# Patient Record
Sex: MALE | Race: WHITE | NOT HISPANIC OR LATINO | Employment: FULL TIME | URBAN - METROPOLITAN AREA
[De-identification: names, ages, dates, MRNs, and addresses within clinical notes are randomized per-mention and may not be internally consistent; named-entity substitution may affect disease eponyms.]

---

## 2019-08-19 ENCOUNTER — APPOINTMENT (EMERGENCY)
Dept: CT IMAGING | Facility: HOSPITAL | Age: 44
DRG: 660 | End: 2019-08-19
Payer: COMMERCIAL

## 2019-08-19 ENCOUNTER — HOSPITAL ENCOUNTER (INPATIENT)
Facility: HOSPITAL | Age: 44
LOS: 1 days | Discharge: HOME | DRG: 660 | End: 2019-08-20
Attending: EMERGENCY MEDICINE | Admitting: INTERNAL MEDICINE
Payer: COMMERCIAL

## 2019-08-19 DIAGNOSIS — D72.829 LEUKOCYTOSIS, UNSPECIFIED TYPE: ICD-10-CM

## 2019-08-19 DIAGNOSIS — N13.2 HYDRONEPHROSIS WITH URINARY OBSTRUCTION DUE TO URETERAL CALCULUS: ICD-10-CM

## 2019-08-19 DIAGNOSIS — N23 RENAL COLIC ON LEFT SIDE: Primary | ICD-10-CM

## 2019-08-19 DIAGNOSIS — R11.2 NAUSEA AND VOMITING, INTRACTABILITY OF VOMITING NOT SPECIFIED, UNSPECIFIED VOMITING TYPE: ICD-10-CM

## 2019-08-19 DIAGNOSIS — N13.30 HYDROURETERONEPHROSIS: ICD-10-CM

## 2019-08-19 LAB
ALBUMIN SERPL BCP-MCNC: 4.3 G/DL (ref 3.5–5)
ALP SERPL-CCNC: 75 U/L (ref 46–116)
ALT SERPL W P-5'-P-CCNC: 37 U/L (ref 12–78)
AMORPH PHOS CRY URNS QL MICRO: ABNORMAL /HPF
ANION GAP SERPL CALCULATED.3IONS-SCNC: 11 MMOL/L (ref 4–13)
AST SERPL W P-5'-P-CCNC: 21 U/L (ref 5–45)
BACTERIA UR QL AUTO: ABNORMAL /HPF
BILIRUB SERPL-MCNC: 0.65 MG/DL (ref 0.2–1)
BILIRUB UR QL STRIP: NEGATIVE
BUN SERPL-MCNC: 15 MG/DL (ref 5–25)
CALCIUM SERPL-MCNC: 9.8 MG/DL (ref 8.3–10.1)
CHLORIDE SERPL-SCNC: 101 MMOL/L (ref 100–108)
CLARITY UR: ABNORMAL
CO2 SERPL-SCNC: 25 MMOL/L (ref 21–32)
COLOR UR: YELLOW
CREAT SERPL-MCNC: 1.19 MG/DL (ref 0.6–1.3)
GFR SERPL CREATININE-BSD FRML MDRD: 73 ML/MIN/1.73SQ M
GLUCOSE SERPL-MCNC: 155 MG/DL (ref 65–140)
GLUCOSE UR STRIP-MCNC: NEGATIVE MG/DL
HGB UR QL STRIP.AUTO: ABNORMAL
KETONES UR STRIP-MCNC: ABNORMAL MG/DL
LEUKOCYTE ESTERASE UR QL STRIP: NEGATIVE
LIPASE SERPL-CCNC: 76 U/L (ref 73–393)
MAGNESIUM SERPL-MCNC: 2.1 MG/DL (ref 1.6–2.6)
NITRITE UR QL STRIP: NEGATIVE
NON-SQ EPI CELLS URNS QL MICRO: ABNORMAL /HPF
PH UR STRIP.AUTO: 8.5 [PH] (ref 4.5–8)
POTASSIUM SERPL-SCNC: 3.9 MMOL/L (ref 3.5–5.3)
PROT SERPL-MCNC: 7.8 G/DL (ref 6.4–8.2)
PROT UR STRIP-MCNC: ABNORMAL MG/DL
RBC #/AREA URNS AUTO: ABNORMAL /HPF
SODIUM SERPL-SCNC: 137 MMOL/L (ref 136–145)
SP GR UR STRIP.AUTO: 1.01 (ref 1–1.03)
UROBILINOGEN UR QL STRIP.AUTO: 0.2 E.U./DL
WBC #/AREA URNS AUTO: ABNORMAL /HPF

## 2019-08-19 PROCEDURE — 83690 ASSAY OF LIPASE: CPT | Performed by: NURSE PRACTITIONER

## 2019-08-19 PROCEDURE — 96361 HYDRATE IV INFUSION ADD-ON: CPT

## 2019-08-19 PROCEDURE — 85025 COMPLETE CBC W/AUTO DIFF WBC: CPT | Performed by: NURSE PRACTITIONER

## 2019-08-19 PROCEDURE — 99285 EMERGENCY DEPT VISIT HI MDM: CPT | Performed by: NURSE PRACTITIONER

## 2019-08-19 PROCEDURE — 99285 EMERGENCY DEPT VISIT HI MDM: CPT

## 2019-08-19 PROCEDURE — 96375 TX/PRO/DX INJ NEW DRUG ADDON: CPT

## 2019-08-19 PROCEDURE — 80053 COMPREHEN METABOLIC PANEL: CPT | Performed by: NURSE PRACTITIONER

## 2019-08-19 PROCEDURE — 81001 URINALYSIS AUTO W/SCOPE: CPT

## 2019-08-19 PROCEDURE — 36415 COLL VENOUS BLD VENIPUNCTURE: CPT | Performed by: NURSE PRACTITIONER

## 2019-08-19 PROCEDURE — 83735 ASSAY OF MAGNESIUM: CPT | Performed by: NURSE PRACTITIONER

## 2019-08-19 PROCEDURE — 74176 CT ABD & PELVIS W/O CONTRAST: CPT

## 2019-08-19 PROCEDURE — 96374 THER/PROPH/DIAG INJ IV PUSH: CPT

## 2019-08-19 RX ORDER — KETOROLAC TROMETHAMINE 30 MG/ML
30 INJECTION, SOLUTION INTRAMUSCULAR; INTRAVENOUS ONCE
Status: DISCONTINUED | OUTPATIENT
Start: 2019-08-19 | End: 2019-08-20

## 2019-08-19 RX ORDER — ONDANSETRON 2 MG/ML
4 INJECTION INTRAMUSCULAR; INTRAVENOUS ONCE
Status: COMPLETED | OUTPATIENT
Start: 2019-08-19 | End: 2019-08-19

## 2019-08-19 RX ORDER — HYDROMORPHONE HCL/PF 1 MG/ML
1 SYRINGE (ML) INJECTION ONCE
Status: COMPLETED | OUTPATIENT
Start: 2019-08-19 | End: 2019-08-19

## 2019-08-19 RX ADMIN — HYDROMORPHONE HYDROCHLORIDE 1 MG: 1 INJECTION, SOLUTION INTRAMUSCULAR; INTRAVENOUS; SUBCUTANEOUS at 22:05

## 2019-08-19 RX ADMIN — ONDANSETRON 4 MG: 2 INJECTION INTRAMUSCULAR; INTRAVENOUS at 22:05

## 2019-08-19 RX ADMIN — SODIUM CHLORIDE 1000 ML: 0.9 INJECTION, SOLUTION INTRAVENOUS at 22:04

## 2019-08-20 ENCOUNTER — APPOINTMENT (INPATIENT)
Dept: RADIOLOGY | Facility: HOSPITAL | Age: 44
DRG: 660 | End: 2019-08-20
Payer: COMMERCIAL

## 2019-08-20 ENCOUNTER — ANESTHESIA EVENT (INPATIENT)
Dept: PERIOP | Facility: HOSPITAL | Age: 44
DRG: 660 | End: 2019-08-20
Payer: COMMERCIAL

## 2019-08-20 ENCOUNTER — APPOINTMENT (INPATIENT)
Dept: CT IMAGING | Facility: HOSPITAL | Age: 44
DRG: 660 | End: 2019-08-20
Payer: COMMERCIAL

## 2019-08-20 ENCOUNTER — TELEPHONE (OUTPATIENT)
Dept: UROLOGY | Facility: CLINIC | Age: 44
End: 2019-08-20

## 2019-08-20 ENCOUNTER — ANESTHESIA (INPATIENT)
Dept: PERIOP | Facility: HOSPITAL | Age: 44
DRG: 660 | End: 2019-08-20
Payer: COMMERCIAL

## 2019-08-20 VITALS
OXYGEN SATURATION: 96 % | HEART RATE: 78 BPM | TEMPERATURE: 98.3 F | DIASTOLIC BLOOD PRESSURE: 82 MMHG | RESPIRATION RATE: 16 BRPM | SYSTOLIC BLOOD PRESSURE: 118 MMHG | WEIGHT: 198.41 LBS

## 2019-08-20 DIAGNOSIS — N13.30 HYDROURETERONEPHROSIS: Primary | ICD-10-CM

## 2019-08-20 PROBLEM — N13.2 HYDRONEPHROSIS WITH URINARY OBSTRUCTION DUE TO URETERAL CALCULUS: Status: RESOLVED | Noted: 2019-08-20 | Resolved: 2019-08-20

## 2019-08-20 PROBLEM — N23 RENAL COLIC ON LEFT SIDE: Status: ACTIVE | Noted: 2019-08-19

## 2019-08-20 PROBLEM — N13.2 HYDRONEPHROSIS WITH URINARY OBSTRUCTION DUE TO URETERAL CALCULUS: Status: ACTIVE | Noted: 2019-08-20

## 2019-08-20 PROBLEM — Z86.79 HISTORY OF PSVT (PAROXYSMAL SUPRAVENTRICULAR TACHYCARDIA): Status: ACTIVE | Noted: 2019-08-20

## 2019-08-20 LAB
ANION GAP SERPL CALCULATED.3IONS-SCNC: 10 MMOL/L (ref 4–13)
BUN SERPL-MCNC: 15 MG/DL (ref 5–25)
CALCIUM SERPL-MCNC: 9.5 MG/DL (ref 8.3–10.1)
CHLORIDE SERPL-SCNC: 103 MMOL/L (ref 100–108)
CO2 SERPL-SCNC: 25 MMOL/L (ref 21–32)
CREAT SERPL-MCNC: 1.24 MG/DL (ref 0.6–1.3)
ERYTHROCYTE [DISTWIDTH] IN BLOOD BY AUTOMATED COUNT: 11.9 % (ref 11.6–15.1)
ERYTHROCYTE [DISTWIDTH] IN BLOOD BY AUTOMATED COUNT: 12.2 % (ref 11.6–15.1)
GFR SERPL CREATININE-BSD FRML MDRD: 70 ML/MIN/1.73SQ M
GLUCOSE SERPL-MCNC: 113 MG/DL (ref 65–140)
GLUCOSE SERPL-MCNC: 92 MG/DL (ref 65–140)
HCT VFR BLD AUTO: 39.6 % (ref 36.5–49.3)
HCT VFR BLD AUTO: 41.6 % (ref 36.5–49.3)
HGB BLD-MCNC: 13.4 G/DL (ref 12–17)
HGB BLD-MCNC: 14.5 G/DL (ref 12–17)
INR PPP: 1.03 (ref 0.84–1.19)
MCH RBC QN AUTO: 32.4 PG (ref 26.8–34.3)
MCH RBC QN AUTO: 32.7 PG (ref 26.8–34.3)
MCHC RBC AUTO-ENTMCNC: 33.8 G/DL (ref 31.4–37.4)
MCHC RBC AUTO-ENTMCNC: 34.9 G/DL (ref 31.4–37.4)
MCV RBC AUTO: 93 FL (ref 82–98)
MCV RBC AUTO: 97 FL (ref 82–98)
PLATELET # BLD AUTO: 181 THOUSANDS/UL (ref 149–390)
PLATELET # BLD AUTO: 196 THOUSANDS/UL (ref 149–390)
PMV BLD AUTO: 12.6 FL (ref 8.9–12.7)
PMV BLD AUTO: 12.6 FL (ref 8.9–12.7)
POTASSIUM SERPL-SCNC: 4.5 MMOL/L (ref 3.5–5.3)
PROTHROMBIN TIME: 13.6 SECONDS (ref 11.6–14.5)
RBC # BLD AUTO: 4.1 MILLION/UL (ref 3.88–5.62)
RBC # BLD AUTO: 4.47 MILLION/UL (ref 3.88–5.62)
SODIUM SERPL-SCNC: 138 MMOL/L (ref 136–145)
WBC # BLD AUTO: 11.03 THOUSAND/UL (ref 4.31–10.16)
WBC # BLD AUTO: 13.6 THOUSAND/UL (ref 4.31–10.16)

## 2019-08-20 PROCEDURE — C1769 GUIDE WIRE: HCPCS | Performed by: UROLOGY

## 2019-08-20 PROCEDURE — 82360 CALCULUS ASSAY QUANT: CPT | Performed by: UROLOGY

## 2019-08-20 PROCEDURE — 99254 IP/OBS CNSLTJ NEW/EST MOD 60: CPT | Performed by: UROLOGY

## 2019-08-20 PROCEDURE — 99239 HOSP IP/OBS DSCHRG MGMT >30: CPT | Performed by: PHYSICIAN ASSISTANT

## 2019-08-20 PROCEDURE — 76000 FLUOROSCOPY <1 HR PHYS/QHP: CPT

## 2019-08-20 PROCEDURE — 80048 BASIC METABOLIC PNL TOTAL CA: CPT | Performed by: PHYSICIAN ASSISTANT

## 2019-08-20 PROCEDURE — 85610 PROTHROMBIN TIME: CPT | Performed by: PHYSICIAN ASSISTANT

## 2019-08-20 PROCEDURE — 82948 REAGENT STRIP/BLOOD GLUCOSE: CPT

## 2019-08-20 PROCEDURE — 52352 CYSTOURETERO W/STONE REMOVE: CPT | Performed by: UROLOGY

## 2019-08-20 PROCEDURE — 52332 CYSTOSCOPY AND TREATMENT: CPT | Performed by: UROLOGY

## 2019-08-20 PROCEDURE — C1758 CATHETER, URETERAL: HCPCS | Performed by: UROLOGY

## 2019-08-20 PROCEDURE — BT1F1ZZ FLUOROSCOPY OF LEFT KIDNEY, URETER AND BLADDER USING LOW OSMOLAR CONTRAST: ICD-10-PCS | Performed by: UROLOGY

## 2019-08-20 PROCEDURE — C2617 STENT, NON-COR, TEM W/O DEL: HCPCS | Performed by: UROLOGY

## 2019-08-20 PROCEDURE — 72192 CT PELVIS W/O DYE: CPT

## 2019-08-20 PROCEDURE — 99223 1ST HOSP IP/OBS HIGH 75: CPT | Performed by: PHYSICIAN ASSISTANT

## 2019-08-20 PROCEDURE — 87086 URINE CULTURE/COLONY COUNT: CPT | Performed by: UROLOGY

## 2019-08-20 PROCEDURE — 85027 COMPLETE CBC AUTOMATED: CPT | Performed by: PHYSICIAN ASSISTANT

## 2019-08-20 PROCEDURE — 0T778DZ DILATION OF LEFT URETER WITH INTRALUMINAL DEVICE, VIA NATURAL OR ARTIFICIAL OPENING ENDOSCOPIC: ICD-10-PCS | Performed by: UROLOGY

## 2019-08-20 DEVICE — STENT URETERAL 6 FR 26CM INLAY OPTIMA: Type: IMPLANTABLE DEVICE | Site: URETER | Status: FUNCTIONAL

## 2019-08-20 RX ORDER — ONDANSETRON 2 MG/ML
INJECTION INTRAMUSCULAR; INTRAVENOUS AS NEEDED
Status: DISCONTINUED | OUTPATIENT
Start: 2019-08-20 | End: 2019-08-20 | Stop reason: SURG

## 2019-08-20 RX ORDER — OXYCODONE HYDROCHLORIDE 5 MG/1
5 TABLET ORAL EVERY 4 HOURS PRN
Status: DISCONTINUED | OUTPATIENT
Start: 2019-08-20 | End: 2019-08-20 | Stop reason: HOSPADM

## 2019-08-20 RX ORDER — HYDROMORPHONE HCL/PF 1 MG/ML
0.5 SYRINGE (ML) INJECTION EVERY 4 HOURS PRN
Status: DISCONTINUED | OUTPATIENT
Start: 2019-08-20 | End: 2019-08-20 | Stop reason: HOSPADM

## 2019-08-20 RX ORDER — FENTANYL CITRATE 50 UG/ML
INJECTION, SOLUTION INTRAMUSCULAR; INTRAVENOUS AS NEEDED
Status: DISCONTINUED | OUTPATIENT
Start: 2019-08-20 | End: 2019-08-20 | Stop reason: SURG

## 2019-08-20 RX ORDER — TAMSULOSIN HYDROCHLORIDE 0.4 MG/1
0.4 CAPSULE ORAL
Qty: 5 CAPSULE | Refills: 0 | Status: SHIPPED | OUTPATIENT
Start: 2019-08-20 | End: 2019-08-20

## 2019-08-20 RX ORDER — MIDAZOLAM HYDROCHLORIDE 1 MG/ML
INJECTION INTRAMUSCULAR; INTRAVENOUS AS NEEDED
Status: DISCONTINUED | OUTPATIENT
Start: 2019-08-20 | End: 2019-08-20 | Stop reason: SURG

## 2019-08-20 RX ORDER — KETOROLAC TROMETHAMINE 30 MG/ML
30 INJECTION, SOLUTION INTRAMUSCULAR; INTRAVENOUS EVERY 6 HOURS SCHEDULED
Status: COMPLETED | OUTPATIENT
Start: 2019-08-20 | End: 2019-08-20

## 2019-08-20 RX ORDER — ONDANSETRON 2 MG/ML
4 INJECTION INTRAMUSCULAR; INTRAVENOUS EVERY 6 HOURS PRN
Status: DISCONTINUED | OUTPATIENT
Start: 2019-08-20 | End: 2019-08-20 | Stop reason: HOSPADM

## 2019-08-20 RX ORDER — SODIUM CHLORIDE 9 MG/ML
125 INJECTION, SOLUTION INTRAVENOUS CONTINUOUS
Status: DISCONTINUED | OUTPATIENT
Start: 2019-08-20 | End: 2019-08-20

## 2019-08-20 RX ORDER — TAMSULOSIN HYDROCHLORIDE 0.4 MG/1
0.4 CAPSULE ORAL
Qty: 5 CAPSULE | Refills: 0 | Status: SHIPPED | OUTPATIENT
Start: 2019-08-20 | End: 2019-12-06

## 2019-08-20 RX ORDER — PHENAZOPYRIDINE HYDROCHLORIDE 100 MG/1
100 TABLET, FILM COATED ORAL
Status: DISCONTINUED | OUTPATIENT
Start: 2019-08-20 | End: 2019-08-20 | Stop reason: HOSPADM

## 2019-08-20 RX ORDER — SODIUM CHLORIDE 9 MG/ML
75 INJECTION, SOLUTION INTRAVENOUS CONTINUOUS
Status: DISCONTINUED | OUTPATIENT
Start: 2019-08-20 | End: 2019-08-20 | Stop reason: HOSPADM

## 2019-08-20 RX ORDER — PROPOFOL 10 MG/ML
INJECTION, EMULSION INTRAVENOUS AS NEEDED
Status: DISCONTINUED | OUTPATIENT
Start: 2019-08-20 | End: 2019-08-20 | Stop reason: SURG

## 2019-08-20 RX ORDER — MAGNESIUM HYDROXIDE 1200 MG/15ML
LIQUID ORAL AS NEEDED
Status: DISCONTINUED | OUTPATIENT
Start: 2019-08-20 | End: 2019-08-20 | Stop reason: HOSPADM

## 2019-08-20 RX ORDER — PHENAZOPYRIDINE HYDROCHLORIDE 100 MG/1
100 TABLET, FILM COATED ORAL
Qty: 6 TABLET | Refills: 0 | Status: SHIPPED | OUTPATIENT
Start: 2019-08-20 | End: 2019-08-22

## 2019-08-20 RX ORDER — ACETAMINOPHEN 325 MG/1
650 TABLET ORAL EVERY 6 HOURS PRN
Status: DISCONTINUED | OUTPATIENT
Start: 2019-08-20 | End: 2019-08-20 | Stop reason: HOSPADM

## 2019-08-20 RX ORDER — SENNOSIDES 8.6 MG
1 TABLET ORAL
Status: DISCONTINUED | OUTPATIENT
Start: 2019-08-20 | End: 2019-08-20 | Stop reason: HOSPADM

## 2019-08-20 RX ORDER — SODIUM CHLORIDE 9 MG/ML
INJECTION, SOLUTION INTRAVENOUS AS NEEDED
Status: DISCONTINUED | OUTPATIENT
Start: 2019-08-20 | End: 2019-08-20 | Stop reason: HOSPADM

## 2019-08-20 RX ORDER — TAMSULOSIN HYDROCHLORIDE 0.4 MG/1
0.4 CAPSULE ORAL
Status: DISCONTINUED | OUTPATIENT
Start: 2019-08-20 | End: 2019-08-20 | Stop reason: HOSPADM

## 2019-08-20 RX ORDER — OXYCODONE HYDROCHLORIDE 5 MG/1
5 TABLET ORAL EVERY 4 HOURS PRN
Qty: 15 TABLET | Refills: 0 | Status: SHIPPED | OUTPATIENT
Start: 2019-08-20 | End: 2019-08-20

## 2019-08-20 RX ORDER — PHENAZOPYRIDINE HYDROCHLORIDE 100 MG/1
100 TABLET, FILM COATED ORAL
Qty: 15 TABLET | Refills: 0 | Status: SHIPPED | OUTPATIENT
Start: 2019-08-20 | End: 2019-08-20

## 2019-08-20 RX ORDER — MAGNESIUM HYDROXIDE/ALUMINUM HYDROXICE/SIMETHICONE 120; 1200; 1200 MG/30ML; MG/30ML; MG/30ML
30 SUSPENSION ORAL EVERY 6 HOURS PRN
Status: DISCONTINUED | OUTPATIENT
Start: 2019-08-20 | End: 2019-08-20 | Stop reason: HOSPADM

## 2019-08-20 RX ORDER — DEXAMETHASONE SODIUM PHOSPHATE 4 MG/ML
INJECTION, SOLUTION INTRA-ARTICULAR; INTRALESIONAL; INTRAMUSCULAR; INTRAVENOUS; SOFT TISSUE AS NEEDED
Status: DISCONTINUED | OUTPATIENT
Start: 2019-08-20 | End: 2019-08-20 | Stop reason: SURG

## 2019-08-20 RX ORDER — CEFAZOLIN SODIUM 1 G/50ML
1000 SOLUTION INTRAVENOUS EVERY 8 HOURS
Status: DISCONTINUED | OUTPATIENT
Start: 2019-08-20 | End: 2019-08-20 | Stop reason: HOSPADM

## 2019-08-20 RX ORDER — OXYCODONE HYDROCHLORIDE 10 MG/1
10 TABLET ORAL EVERY 4 HOURS PRN
Status: DISCONTINUED | OUTPATIENT
Start: 2019-08-20 | End: 2019-08-20 | Stop reason: HOSPADM

## 2019-08-20 RX ORDER — LIDOCAINE HYDROCHLORIDE 20 MG/ML
INJECTION, SOLUTION EPIDURAL; INFILTRATION; INTRACAUDAL; PERINEURAL AS NEEDED
Status: DISCONTINUED | OUTPATIENT
Start: 2019-08-20 | End: 2019-08-20 | Stop reason: SURG

## 2019-08-20 RX ORDER — OXYCODONE HYDROCHLORIDE 5 MG/1
5 TABLET ORAL EVERY 4 HOURS PRN
Qty: 15 TABLET | Refills: 0 | Status: SHIPPED | OUTPATIENT
Start: 2019-08-20 | End: 2019-08-25

## 2019-08-20 RX ORDER — LIDOCAINE HYDROCHLORIDE 20 MG/ML
JELLY TOPICAL AS NEEDED
Status: DISCONTINUED | OUTPATIENT
Start: 2019-08-20 | End: 2019-08-20 | Stop reason: HOSPADM

## 2019-08-20 RX ADMIN — CEFAZOLIN SODIUM 1000 MG: 1 SOLUTION INTRAVENOUS at 00:45

## 2019-08-20 RX ADMIN — LIDOCAINE HYDROCHLORIDE 60 MG: 20 INJECTION, SOLUTION EPIDURAL; INFILTRATION; INTRACAUDAL; PERINEURAL at 11:48

## 2019-08-20 RX ADMIN — FENTANYL CITRATE 50 MCG: 50 INJECTION, SOLUTION INTRAMUSCULAR; INTRAVENOUS at 11:48

## 2019-08-20 RX ADMIN — MIDAZOLAM 2 MG: 1 INJECTION INTRAMUSCULAR; INTRAVENOUS at 11:42

## 2019-08-20 RX ADMIN — SODIUM CHLORIDE 75 ML/HR: 0.9 INJECTION, SOLUTION INTRAVENOUS at 00:45

## 2019-08-20 RX ADMIN — CEFAZOLIN SODIUM 1000 MG: 1 SOLUTION INTRAVENOUS at 11:45

## 2019-08-20 RX ADMIN — SODIUM CHLORIDE 75 ML/HR: 0.9 INJECTION, SOLUTION INTRAVENOUS at 12:48

## 2019-08-20 RX ADMIN — PROPOFOL 200 MG: 10 INJECTION, EMULSION INTRAVENOUS at 11:48

## 2019-08-20 RX ADMIN — KETOROLAC TROMETHAMINE 30 MG: 30 INJECTION, SOLUTION INTRAMUSCULAR at 00:45

## 2019-08-20 RX ADMIN — PHENAZOPYRIDINE 100 MG: 100 TABLET ORAL at 14:07

## 2019-08-20 RX ADMIN — ONDANSETRON 4 MG: 2 INJECTION INTRAMUSCULAR; INTRAVENOUS at 11:55

## 2019-08-20 RX ADMIN — CEFAZOLIN SODIUM 1000 MG: 1 SOLUTION INTRAVENOUS at 08:58

## 2019-08-20 RX ADMIN — KETOROLAC TROMETHAMINE 30 MG: 30 INJECTION, SOLUTION INTRAMUSCULAR at 10:58

## 2019-08-20 RX ADMIN — DEXAMETHASONE SODIUM PHOSPHATE 4 MG: 4 INJECTION, SOLUTION INTRAMUSCULAR; INTRAVENOUS at 11:55

## 2019-08-20 RX ADMIN — FENTANYL CITRATE 50 MCG: 50 INJECTION, SOLUTION INTRAMUSCULAR; INTRAVENOUS at 12:00

## 2019-08-20 RX ADMIN — TAMSULOSIN HYDROCHLORIDE 0.4 MG: 0.4 CAPSULE ORAL at 14:07

## 2019-08-20 RX ADMIN — KETOROLAC TROMETHAMINE 30 MG: 30 INJECTION, SOLUTION INTRAMUSCULAR at 05:00

## 2019-08-20 NOTE — ED PROVIDER NOTES
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, then developed a left lower abdomen pain and vomited once and it was brown in color  This is a 38year old male who states was working in an un airconditioned building today and thought maybe he had heat stroke around 12 noon today as he became hot, and sweaty  He went inside his home, laid in front of the air conditioner and seemed to feel better  He returned to that same area to complete his work and again wasn't feeling well  He states about 430pm he got into his car to go  his daughter from day care and developed severe sharp LLQ pain that radiates into the left flank  His wife states about 2 hours ago he vomited brown liquid emesis  Pt denies any hx of abdominal problems or surgeries  States last BM today 10pm and was normal   He denies any dysuria or hematuria but states is only voiding a little  Denies hx of kidney stones  Denies testicle pain, swelling or hernia hx       History provided by:  Patient, medical records and spouse   used: No        None       History reviewed  No pertinent past medical history  History reviewed  No pertinent surgical history  History reviewed  No pertinent family history  I have reviewed and agree with the history as documented  Social History     Tobacco Use    Smoking status: Never Smoker    Smokeless tobacco: Never Used   Substance Use Topics    Alcohol use: Yes     Frequency: Never     Comment: daily 2 beers    Drug use: Never        Review of Systems   Constitutional:        Sweats    HENT: Negative  Eyes: Negative  Respiratory: Negative  Cardiovascular: Negative  Gastrointestinal: Positive for abdominal pain, nausea and vomiting  Endocrine: Negative  Genitourinary: Positive for decreased urine volume and flank pain  Skin: Negative  Allergic/Immunologic: Negative  Neurological: Negative  Hematological: Negative  Psychiatric/Behavioral: Negative  Physical Exam  Physical Exam   Constitutional: He is oriented to person, place, and time  He appears well-developed and well-nourished  Non-toxic appearance  He appears ill  No distress  Appears not to feel well but is not toxic    HENT:   Head: Normocephalic and atraumatic  Mouth/Throat: Oropharynx is clear and moist    Eyes: Pupils are equal, round, and reactive to light  EOM are normal    Cardiovascular: Normal rate, regular rhythm and normal heart sounds  Pulmonary/Chest: Effort normal and breath sounds normal    Abdominal: Normal appearance  He exhibits distension  Bowel sounds are increased  There is tenderness in the left lower quadrant  There is CVA tenderness  Hyper bs RUQ and RLQ  Left flank pain    Neurological: He is alert and oriented to person, place, and time  Skin: Skin is warm  Capillary refill takes less than 2 seconds  There is pallor  Psychiatric: He has a normal mood and affect  His behavior is normal    Nursing note and vitals reviewed        Vital Signs  ED Triage Vitals   Temperature Pulse Respirations Blood Pressure SpO2   08/19/19 2139 08/19/19 2139 08/19/19 2139 08/19/19 2139 08/19/19 2139   (!) 97 3 °F (36 3 °C) 88 18 155/97 98 %      Temp Source Heart Rate Source Patient Position - Orthostatic VS BP Location FiO2 (%)   08/19/19 2139 08/19/19 2139 08/19/19 2139 08/19/19 2139 --   Temporal Monitor Sitting Right arm       Pain Score       08/19/19 2205       Worst Possible Pain           Vitals:    08/19/19 2139 08/19/19 2243 08/20/19 0010 08/20/19 0029   BP: 155/97 142/83 151/91 156/86   Pulse: 88 94 100 94   Patient Position - Orthostatic VS: Sitting Lying Lying Lying         Visual Acuity      ED Medications  Medications   sodium chloride 0 9 % infusion (75 mL/hr Intravenous New Bag 8/20/19 0045)   ondansetron (ZOFRAN) injection 4 mg (has no administration in time range) aluminum-magnesium hydroxide-simethicone (MYLANTA) 200-200-20 mg/5 mL oral suspension 30 mL (has no administration in time range)   senna (SENOKOT) tablet 8 6 mg (has no administration in time range)   acetaminophen (TYLENOL) tablet 650 mg (has no administration in time range)   ketorolac (TORADOL) injection 30 mg (30 mg Intravenous Given 8/20/19 0045)   HYDROmorphone (DILAUDID) injection 0 5 mg (has no administration in time range)   oxyCODONE (ROXICODONE) immediate release tablet 10 mg (has no administration in time range)   oxyCODONE (ROXICODONE) IR tablet 5 mg (has no administration in time range)   ceFAZolin (ANCEF) IVPB (premix) 1,000 mg (1,000 mg Intravenous New Bag 8/20/19 0045)   ondansetron (ZOFRAN) injection 4 mg (4 mg Intravenous Given 8/19/19 2205)   sodium chloride 0 9 % bolus 1,000 mL (0 mL Intravenous Stopped 8/19/19 2330)   HYDROmorphone (DILAUDID) injection 1 mg (1 mg Intravenous Given 8/19/19 2205)       Diagnostic Studies  Results Reviewed     Procedure Component Value Units Date/Time    CBC and differential [[id number]]  (Abnormal) Collected:  08/19/19 2203    Lab Status:  Final result Specimen:  Blood from Arm, Left Updated:  08/20/19 0036     WBC 13 60 Thousand/uL      RBC 4 47 Million/uL      Hemoglobin 14 5 g/dL      Hematocrit 41 6 %      MCV 93 fL      MCH 32 4 pg      MCHC 34 9 g/dL      RDW 11 9 %      MPV 12 6 fL      Platelets 567 Thousands/uL     Narrative: This is an appended report  These results have been appended to a previously verified report      Comprehensive metabolic panel [[id number]]  (Abnormal) Collected:  08/19/19 2203    Lab Status:  Final result Specimen:  Blood from Arm, Left Updated:  08/19/19 2240     Sodium 137 mmol/L      Potassium 3 9 mmol/L      Chloride 101 mmol/L      CO2 25 mmol/L      ANION GAP 11 mmol/L      BUN 15 mg/dL      Creatinine 1 19 mg/dL      Glucose 155 mg/dL      Calcium 9 8 mg/dL      AST 21 U/L      ALT 37 U/L      Alkaline Phosphatase 75 U/L      Total Protein 7 8 g/dL      Albumin 4 3 g/dL      Total Bilirubin 0 65 mg/dL      eGFR 73 ml/min/1 73sq m     Narrative:       [place] guidelines for Chronic Kidney Disease (CKD):     Stage 1 with normal or high GFR (GFR > 90 mL/min/1 73 square meters)    Stage 2 Mild CKD (GFR = 60-89 mL/min/1 73 square meters)    Stage 3A Moderate CKD (GFR = 45-59 mL/min/1 73 square meters)    Stage 3B Moderate CKD (GFR = 30-44 mL/min/1 73 square meters)    Stage 4 Severe CKD (GFR = 15-29 mL/min/1 73 square meters)    Stage 5 End Stage CKD (GFR <15 mL/min/1 73 square meters)  Note: GFR calculation is accurate only with a steady state creatinine    Lipase [[id number]]  (Normal) Collected:  08/19/19 2203    Lab Status:  Final result Specimen:  Blood from Arm, Left Updated:  08/19/19 2240     Lipase 76 u/L     Magnesium [[id number]]  (Normal) Collected:  08/19/19 2203    Lab Status:  Final result Specimen:  Blood from Arm, Left Updated:  08/19/19 2240     Magnesium 2 1 mg/dL     Urine Microscopic [[id number]]  (Abnormal) Collected:  08/19/19 2207    Lab Status:  Final result Specimen:  Urine, Clean Catch Updated:  08/19/19 2238     RBC, UA 2-4 /hpf      WBC, UA None Seen /hpf      Epithelial Cells Occasional /hpf      Bacteria, UA Occasional /hpf      AMORPH PHOSPATES Occasional /hpf     POCT urinalysis dipstick [[id number]]  (Abnormal) Resulted:  08/19/19 2218    Lab Status:  Final result Specimen:  Urine Updated:  08/19/19 2218    ED Urine Macroscopic [[id number]]  (Abnormal) Collected:  08/19/19 2207    Lab Status:  Final result Specimen:  Urine Updated:  08/19/19 2208     Color, UA Yellow     Clarity, UA Turbid     pH, UA 8 5     Leukocytes, UA Negative     Nitrite, UA Negative     Protein, UA Trace mg/dl      Glucose, UA Negative mg/dl      Ketones, UA Trace mg/dl      Urobilinogen, UA 0 2 E U /dl      Bilirubin, UA Negative     Blood, UA Small     Specific [place], UA 1 015    Narrative: [facility] RESULT                 CT renal stone study abdomen pelvis without contrast   Final Result by Juan Marrero DO (08/19 2309)      Moderate left-sided hydroureteronephrosis with a 3 mm obstructing stone in the distal left ureter/UVJ  Moderate left-sided perinephric stranding  Mild thickening of the urinary bladder wall which may represent cystitis  Workstation performed: [id number]                    Procedures  Procedures       ED Course  ED Course as of Aug 20 0120   [facility] Aug 19, 2019   2247 Urine small blood, trace ketones and protein  WBC 13 60  Glucose 155  Waiting on dry CT results  2331 IMPRESSION:     Moderate left-sided hydroureteronephrosis with a 3 mm obstructing stone in the distal left ureter/UVJ  Moderate left-sided perinephric stranding      Mild thickening of the urinary bladder wall which may represent cystitis           2332 Lab micro urine negative for WBC, bacteria  CT + 3mm obstruction stone in the distal left ureter, UVJ moderate Left sided perinephric stranding  1 All labs and radiology results reviewed and discussed with pt and wife  2336 Pt agreeable for admission  2336 Page to [place] for admission  26 [facility] has accepted for admission  MDM  Number of Diagnoses or Management Options  Diagnosis management comments: Differential diagnosis  Renal colic  Kidney stone  Diverticulitis  Bowel obstruction    Plan  Labs  IVF  IV  Pain control  NPO  CT A/P - will dry scan to rule out kidney stone  If negative will order CT A/P with oral and IV contrast to rule out other pathology     Urine         Amount and/or Complexity of Data Reviewed  Clinical lab tests: ordered and reviewed  Tests in the radiology section of CPT®: ordered and reviewed  Review and summarize past medical records: yes        Disposition  Final diagnoses:   Renal colic on left side   Hydroureteronephrosis   Leukocytosis, unspecified type Nausea and vomiting, intractability of vomiting not specified, unspecified vomiting type     Time reflects when diagnosis was documented in both MDM as applicable and the Disposition within this note     Time User Action Codes Description Comment    8/19/2019 11:37 PM Apurva Fernandes Add [P42] Renal colic on left side     8/19/2019 11:37 PM Lawrence Norrisraudel [N13 30] Hydroureteronephrosis     8/19/2019 11:38 PM Apurva Dena Add [D72 829] Leukocytosis, unspecified type     8/19/2019 11:38 PM Apurva Dena Add [R11 2] Nausea and vomiting, intractability of vomiting not specified, unspecified vomiting type       ED Disposition     ED Disposition Condition Date/Time Comment    Admit Stable Mon Aug 19, 2019 11:55 PM Case was discussed with CHARI  and the patient's admission status was agreed to be Admission Status: inpatient status to the service of Dr Rosie Chaudhry   Follow-up Information    None         There are no discharge medications for this patient  No discharge procedures on file      ED Provider  Electronically Signed by           ISAAC Ramirez  08/20/19 0120

## 2019-08-20 NOTE — OP NOTE
OPERATIVE REPORT  PATIENT NAME: Tristen Shore    :  1975  MRN: [id number]  Pt Location: [facility] OR ROOM 05    SURGERY DATE: 2019    Surgeon(s) and Role:     * Brad Benjamin MD - Primary    Preop Diagnosis:  Renal colic on left side [M00]  Hydroureteronephrosis [N13 30]    Post-Op Diagnosis Codes:     * Renal colic on left side [O19]     * Hydroureteronephrosis [N13 30]    Procedure(s) (LRB):  CYSTOSCOPY URETEROSCOPY; RETROGRADE PYELOGRAM AND INSERTION STENT URETERAL (Left)    Specimen(s):  ID Type Source Tests Collected by Time Destination   A :  Urine Urine, Cystoscopic URINE CULTURE Brad Benjamin MD 2019 1203    B :  Calculus Kidney, Left STONE ANALYSIS Brad Benjamin MD 2019 1210        Estimated Blood Loss:   Minimal    Drains:  Ureteral Drain/Stent Left ureter 6 Fr  (Active)   Number of days: 0       Anesthesia Type:   General    Operative Indications:  Renal colic on left side [R79]  Hydroureteronephrosis [N13 30]      Operative Findings:  Distal ureteral stone    Complications:   None    Procedure and Technique:  Tristen Shore is a 38y o -year-old male  with a history of left distal ureteral stone  No prior stone history  Risk and benefits of ureteroscopy were discussed and reviewed  Informed consent was obtained  The patient was brought to the operating room on 2019  After the smooth induction of general LMA anesthesia, the patient was placed in the dorsal lithotomy position  His genitalia was prepped and draped in a sterile fashion  Intravenous antibiotics were administered in the form of Ancef  A timeout was performed with all members of the operative team confirmed the patient's identity, procedure to be performed, and laterality of the case  A 22 [place]ian rigid cystoscope with 30° lens was inserted  The bladder was thoroughly inspected  It was no evidence of mucosal abnormalities or lesions  There were no calculi identified   Attention was focused on the left ureteral orifice   flouroscopy demonstrated a non radioopaque stone  A Bard Solo Plus wire was passed proximal to the stone and secured as a safety  A dual lumen catheter was then advanced over the wire and used to dilate the very distal ureter under direct vision  A retrograde pyelogram was performed that demonstrated mild hydroureteronephrosis  The dual-lumen was removed leaving the safety wire in place  A long semirigid ureteroscope was then inserted adjacent to the wire  The stone was identified and removed with an expand basket  The ureteroscope was then repassed multiple times to ensure that the ureter was free and clear of any residual stones  Additional contrast was injected as a roadmap for stent insertion  The ureteroscope was then removed  The wire was backloaded through the cystoscope  The cystoscope was repassed into the bladder  A 6 Urdu 26 double-J ureteral stent was then placed over the previously banked safety wire without difficulty  The proximal coil was appreciated in the left renal pelvis and the distal coil was visualized within the bladder  The bladder was emptied and the cystoscope was removed  A string was left in place and secured to the dorsal penis with Tegaderm  2% viscous lidocaine was placed per urethra  Overall the patient tolerated the procedure well and were no complications  The patient was extubated in the operating room and transferred to the PACU in stable condition at the conclusion of the case  Plan-stent and string removal in 3-5 days, follow up in our office for stone disease         I was present for the entire procedure    Patient Disposition:  PACU     SIGNATURE: Loco Light MD  DATE: August 20, 2019  TIME: 12:22 PM

## 2019-08-20 NOTE — ASSESSMENT & PLAN NOTE
Patient presents with 12 hours of LLQ abdominal pain with nausea and vomiting  No prior history of kidney stones   CT with evidence of moderate left hydroureteronephrosis with 3 mm obstructing stone in distal left UVJ, moderate perinephric stranding   Will begin IV Ancef 1 g q8   Urology consult  NPO until seen by urology   Scheduled Toradol over night with PRN oxycodone and dilaudid for breakthrough   PRN Zofran    Gentle IV fluid hydration

## 2019-08-20 NOTE — CONSULTS
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  If the stone has passed, will discharged home with outpatient follow-up  If the stone is still present, discussed plan for surgery with patient including cystoscopy, [LEFT] retrograde pyelogram, ureteroscopy with possible laser lithotripsy and basket extraction of stone, stent placement  Risks and benefits discussed and patient has signed informed consent  Thank you for allowing me to participate in this patients care  Please do not hesitate to call with any additional questions  Chencho Chino MD    History of Present Illness:     John Euceda is a 38 y o  old with a history of no prior stone disease  Patient presents with acute left-sided pain  CT scan demonstrates 3 mm ureterovesical junction stone  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  He is anxious about the return of pain  After admission overnight, the patient's pain has completely resolved  Past Medical, Past Surgical History:   History reviewed  No pertinent past medical history :    History reviewed   No pertinent surgical history :    Medications, Allergies:     Current Facility-Administered Medications:     acetaminophen (TYLENOL) tablet 650 mg, 650 mg, Oral, Q6H PRN, Makayla Murray PA-C    aluminum-magnesium hydroxide-simethicone (MYLANTA) 200-200-20 mg/5 mL oral suspension 30 mL, 30 mL, Oral, Q6H PRN, Nino Murray PA-C    ceFAZolin (ANCEF) IVPB (premix) 1,000 mg, 1,000 mg, Intravenous, Q8H, Makayla Murray, PA-C, Stopped at 08/20/19 0115    HYDROmorphone (DILAUDID) injection 0 5 mg, 0 5 mg, Intravenous, Q4H PRN, Carlitos Murray PA-C    ketorolac (TORADOL) injection 30 mg, 30 mg, Intravenous, Q6H YUE, Makayla Murray, PA-C, 30 mg at 08/20/19 0500    ondansetron (ZOFRAN) injection 4 mg, 4 mg, Intravenous, Q6H PRN, Carlitos Murray, PA-C    oxyCODONE (ROXICODONE) immediate release tablet 10 mg, 10 mg, Oral, Q4H PRN, Carlitos Murray, PA-C    oxyCODONE (ROXICODONE) IR tablet 5 mg, 5 mg, Oral, Q4H PRN, Carlitos Murray, PA-C    senna (SENOKOT) tablet 8 6 mg, 1 tablet, Oral, HS PRN, Carlitos Murray PA-C    sodium chloride 0 9 % infusion, 75 mL/hr, Intravenous, Continuous, Makayla Murray PA-C, Last Rate: 75 mL/hr at 08/20/19 0045, 75 mL/hr at 08/20/19 0045    Allergies:  No Known Allergies:    Social and Family History:   Social History:   Social History     Tobacco Use    Smoking status: Never Smoker    Smokeless tobacco: Never Used   Substance Use Topics    Alcohol use: Yes     Frequency: Never     Comment: daily 2 beers    Drug use: Never     Social History     Tobacco Use   Smoking Status Never Smoker   Smokeless Tobacco Never Used       Family History:  History reviewed  No pertinent family history :     Review of Systems:     General: Fever, chills, or night sweats: negative  Cardiac: Negative for chest pain  Pulmonary: Negative for shortness of breath  Gastrointestinal: Abdominal pain positive  Nausea, vomiting, or diarrhea negative,  Genitourinary: See HPI above  Patient does not have hematuria  All other systems queried were negative  Physical Exam:   General: Patient is pleasant and in NAD  Awake and alert  /86 (BP Location: Left arm)   Pulse 94   Temp 97 9 °F (36 6 °C) (Temporal)   Resp 20   Wt 90 kg (198 lb 6 6 oz)   SpO2 91%   Cardiac: Peripheral edema: negative  Pulmonary: Non-labored breathing  Abdomen: Soft, non-tender, non-distended  No surgical scars  No masses, tenderness, hernias noted  Genitourinary: Positive CVA tenderness, negative suprapubic tenderness  (Male): Penis circumcised, phallus normal, meatus patent  Testicles descended into scrotum bilaterally without masses nor tenderness  No inguinal hernias bilaterally  Labs:     Lab Results   Component Value Date    HGB 13 4 08/20/2019    HCT 39 6 08/20/2019    WBC 11 03 (H) 08/20/2019     08/20/2019   ]    Lab Results   Component Value Date    K 4 5 08/20/2019     08/20/2019    CO2 25 08/20/2019    BUN 15 08/20/2019    CREATININE 1 24 08/20/2019    CALCIUM 9 5 08/20/2019 8/19/19 2207     RBC, UA None Seen, 0-5 /hpf 2-4Abnormal     WBC, UA None Seen, 0-5, 5-55, 5-65 /hpf None Seen    Epithelial Cells None Seen, Occasional /hpf Occasional    Bacteria, UA None Seen, Occasional /hpf Occasional    AMORPH PHOSPATES /hpf Occasional          Specimen Collected: 08/19/19 22:07        Imaging:   I personally reviewed the images and report of the following studies, and reviewed them with the patient:    8/19/19  CT ABDOMEN AND PELVIS WITHOUT IV CONTRAST - LOW DOSE RENAL STONE      INDICATION:   Abdominal pain, unspecified  Flank pain, stone disease suspected  Hematuria      COMPARISON:  None      TECHNIQUE:  Low dose thin section CT examination of the abdomen and pelvis was performed without intravenous or oral contrast according to a protocol specifically designed to evaluate for urinary tract calculus  Axial, sagittal, and coronal 2D   reformatted images were created from the source data and submitted for interpretation  Evaluation for pathology in the abdomen and pelvis that is unrelated to urinary tract calculi is limited       Radiation dose length product (DLP) for this visit:  573 mGy-cm     This examination, like all CT scans performed in the [facility], was performed utilizing techniques to minimize radiation dose exposure, including the use of iterative reconstruction and automated exposure control       FINDINGS:     RIGHT KIDNEY AND URETER:  No urinary tract calculi  No hydronephrosis or hydroureter      LEFT KIDNEY AND URETER:  Moderate left-sided hydroureteronephrosis with a 3 mm obstructing stone in the distal left ureter/UVJ  Moderate left-sided perinephric stranding is seen      URINARY BLADDER:   Mild thickening of the urinary bladder wall is visualized      No significant abnormality in the visualized lung bases      Limited low radiation dose noncontrast CT evaluation demonstrates no clinically significant abnormality of liver, spleen, pancreas, or adrenal glands  No calcified gallstones or gallbladder wall thickening noted  No ascites or bulky lymphadenopathy on this limited noncontrast study  Bowel loops appear unremarkable  The appendix is not well evaluated  No acute fracture or destructive osseous lesion is identified         IMPRESSION:     Moderate left-sided hydroureteronephrosis with a 3 mm obstructing stone in the distal left ureter/UVJ  Moderate left-sided perinephric stranding      Mild thickening of the urinary bladder wall which may represent cystitis

## 2019-08-20 NOTE — H&P
[address] [facility]  H&P- Gerry Bear 1975, 38 y o  male MRN: [id number]    Unit/Bed#: [facility] 68 2 [facility] [id number] Encounter: [id number]    Primary Care Provider: Mil Maya MD   Date and time admitted to hospital: 8/19/2019  9:42 PM        * Hydronephrosis with urinary obstruction due to ureteral calculus  Assessment & Plan  Patient presents with 12 hours of LLQ abdominal pain with nausea and vomiting  No prior history of kidney stones   CT with evidence of moderate left hydroureteronephrosis with 3 mm obstructing stone in distal left UVJ, moderate perinephric stranding   Will begin IV Ancef 1 g q8   Urology consult  NPO until seen by urology   Scheduled Toradol over night with PRN oxycodone and dilaudid for breakthrough   PRN Zofran    Gentle IV fluid hydration     History of PSVT (paroxysmal supraventricular tachycardia)  Assessment & Plan  History of PSVT, currently in sinus rhythm  Continue outpatient cardiology follow up     VTE Prophylaxis: low risk    Code Status: full code  POLST: POLST form is not discussed and not completed at this time  Discussion with family: wife at bedside    Anticipated Length of Stay:  Patient will be admitted on an Inpatient basis with an anticipated length of stay of  More than 2 midnights  Justification for Hospital Stay: hydronephrosis with obstructing stone     Total Time for Visit, including Counseling / Coordination of Care: 30 minutes  Greater than 50% of this total time spent on direct patient counseling and coordination of care  Chief Complaint:   Abdominal pain     History of Present Illness:    Gerry Bear is a 38 y o  male with a past medical history of PSVT who presents with abdominal pain  Patient reports he was working in an unairconditioned building earlier today  Patient reports that around noon he began feeling left-sided abdominal pain    Patient reports he was leaving work and picking his daughter up and  when he noticed worsening left-sided abdominal pain  Patient also reports associated diaphoresis  Patient reports pain radiates to left flank  Also reports 1 episode of vomiting earlier this evening  Patient denies history of kidney stones or prior abdominal surgeries  Patient denies any dysuria or hematuria  Review of Systems:    Review of Systems   Constitutional: Negative for fatigue and fever  HENT: Negative for ear pain, nosebleeds and trouble swallowing  Eyes: Negative for photophobia  Respiratory: Negative for cough, chest tightness, shortness of breath and wheezing  Cardiovascular: Negative for chest pain, palpitations and leg swelling  Gastrointestinal: Positive for abdominal pain, nausea and vomiting  Negative for diarrhea  Endocrine: Negative  Genitourinary: Positive for flank pain  Negative for dysuria and hematuria  Musculoskeletal: Negative for back pain and neck stiffness  Skin: Negative for rash  Allergic/Immunologic: Negative  Neurological: Negative for syncope, speech difficulty, weakness and light-headedness  Hematological: Negative  Psychiatric/Behavioral: Negative for confusion and sleep disturbance  All other systems reviewed and are negative  Past Medical and Surgical History:     History reviewed  No pertinent past medical history  History reviewed  No pertinent surgical history  Meds/Allergies:    Prior to Admission medications    Not on File     I have reviewed home medications with patient personally      Allergies: No Known Allergies    Social History:     Marital Status: /Civil Union   Occupation:  Professor  Patient Pre-hospital Living Situation:  Home with family  Patient Pre-hospital Level of Mobility:  Ambulatory  Patient Pre-hospital Diet Restrictions:  None  Substance Use History:   Social History     Substance and Sexual Activity   Alcohol Use Yes    Frequency: Never    Comment: daily 2 beers     Social History     Tobacco Use   Smoking Status Never Smoker   Smokeless Tobacco Never Used     Social History     Substance and Sexual Activity   Drug Use Never       Family History:    History reviewed  No pertinent family history  Physical Exam:     Vitals:   Blood Pressure: 156/86 (08/20/19 0029)  Pulse: 94 (08/20/19 0029)  Temperature: 97 9 °F (36 6 °C) (08/20/19 0029)  Temp Source: Temporal (08/20/19 0029)  Respirations: 20 (08/20/19 0029)  Weight - Scale: 90 kg (198 lb 6 6 oz) (08/19/19 2138)  SpO2: 91 % (08/20/19 0029)    Physical Exam   Constitutional: He is oriented to person, place, and time  He appears ill  HENT:   Head: Normocephalic and atraumatic  Mouth/Throat: Oropharynx is clear and moist    Eyes: Conjunctivae and EOM are normal  No scleral icterus  Neck: Neck supple  Cardiovascular: Normal rate, regular rhythm and normal heart sounds  No murmur heard  Pulmonary/Chest: Effort normal and breath sounds normal  No respiratory distress  He has no wheezes  Abdominal: Soft  Bowel sounds are normal  There is tenderness (left upper and lower quadrants )  There is no rebound and no guarding  Musculoskeletal: He exhibits no edema or deformity  Neurological: He is alert and oriented to person, place, and time  Skin: Skin is warm and dry  Psychiatric: He has a normal mood and affect  His behavior is normal  Thought content normal    Vitals reviewed  Additional Data:     Lab Results: I have personally reviewed pertinent reports        Results from last 7 days   Lab Units 08/19/19  2203   WBC Thousand/uL 13 60*   HEMOGLOBIN g/dL 14 5   HEMATOCRIT % 41 6   PLATELETS Thousands/uL 196     Results from last 7 days   Lab Units 08/19/19  2203   SODIUM mmol/L 137   POTASSIUM mmol/L 3 9   CHLORIDE mmol/L 101   CO2 mmol/L 25   BUN mg/dL 15   CREATININE mg/dL 1 19   ANION GAP mmol/L 11   CALCIUM mg/dL 9 8   ALBUMIN g/dL 4 3   TOTAL BILIRUBIN mg/dL 0 65   ALK PHOS U/L 75   ALT U/L 37   AST U/L 21   GLUCOSE RANDOM mg/dL 155* Imaging: I have personally reviewed pertinent reports  CT renal stone study abdomen pelvis without contrast   Final Result by Meir Gresham DO (08/19 2309)      Moderate left-sided hydroureteronephrosis with a 3 mm obstructing stone in the distal left ureter/UVJ  Moderate left-sided perinephric stranding  Mild thickening of the urinary bladder wall which may represent cystitis  Workstation performed: [id number]               Allscripts / Epic Records Reviewed: Yes     ** Please Note: This note has been constructed using a voice recognition system   **

## 2019-08-20 NOTE — NURSING NOTE
Pt urinated and states that he did have discomfort with pain  Urine was bloody which concerned pt  Pt reassured that is a normal finding after the type of surgery he just had earlier today  Pt agreeable to discharge  Peripheral IV removed  IV fluids discontinued  Discharge paperwork reviewed with spouse and pt  All questions answered  Scripts given to pt and spouse   Pt ambulated with spouse out of hospital

## 2019-08-20 NOTE — PLAN OF CARE
Problem: PAIN - ADULT  Goal: Verbalizes/displays adequate comfort level or baseline comfort level  Description  Interventions:  - Encourage patient to monitor pain and request assistance  - Assess pain using appropriate pain scale  - Administer analgesics based on type and severity of pain and evaluate response  - Implement non-pharmacological measures as appropriate and evaluate response  - Consider cultural and social influences on pain and pain management  - Notify physician/advanced practitioner if interventions unsuccessful or patient reports new pain  Outcome: Progressing     Problem: INFECTION - ADULT  Goal: Absence or prevention of progression during hospitalization  Description  INTERVENTIONS:  - Assess and monitor for signs and symptoms of infection  - Monitor lab/diagnostic results  - Monitor all insertion sites, i e  indwelling lines, tubes, and drains  - Monitor endotracheal if appropriate and nasal secretions for changes in amount and color  - [place] appropriate cooling/warming therapies per order  - Administer medications as ordered  - Instruct and encourage patient and family to use good hand hygiene technique  - Identify and instruct in appropriate isolation precautions for identified infection/condition  Outcome: Progressing  Goal: Absence of fever/infection during neutropenic period  Description  INTERVENTIONS:  - Monitor WBC    Outcome: Progressing     Problem: SAFETY ADULT  Goal: Patient will remain free of falls  Description  INTERVENTIONS:  - Assess patient frequently for physical needs  -  Identify cognitive and physical deficits and behaviors that affect risk of falls    -  [place] fall precautions as indicated by assessment   - Educate patient/family on patient safety including physical limitations  - Instruct patient to call for assistance with activity based on assessment  - Modify environment to reduce risk of injury  - Consider OT/PT consult to assist with strengthening/mobility  Outcome: Progressing  Goal: Maintain or return to baseline ADL function  Description  INTERVENTIONS:  -  Assess patient's ability to carry out ADLs; assess patient's baseline for ADL function and identify physical deficits which impact ability to perform ADLs (bathing, care of mouth/teeth, toileting, grooming, dressing, etc )  - Assess/evaluate cause of self-care deficits   - Assess range of motion  - Assess patient's mobility; develop plan if impaired  - Assess patient's need for assistive devices and provide as appropriate  - Encourage maximum independence but intervene and supervise when necessary  - Involve family in performance of ADLs  - Assess for home care needs following discharge   - Consider OT consult to assist with ADL evaluation and planning for discharge  - Provide patient education as appropriate  Outcome: Progressing  Goal: Maintain or return mobility status to optimal level  Description  INTERVENTIONS:  - Assess patient's baseline mobility status (ambulation, transfers, stairs, etc )    - Identify cognitive and physical deficits and behaviors that affect mobility  - Identify mobility aids required to assist with transfers and/or ambulation (gait belt, sit-to-stand, lift, walker, cane, etc )  - [place] fall precautions as indicated by assessment  - Record patient progress and toleration of activity level on Mobility SBAR; progress patient to next Phase/Stage  - Instruct patient to call for assistance with activity based on assessment  - Consider rehabilitation consult to assist with strengthening/weightbearing, etc   Outcome: Progressing     Problem: DISCHARGE PLANNING  Goal: Discharge to home or other facility with appropriate resources  Description  INTERVENTIONS:  - Identify barriers to discharge w/patient and caregiver  - Arrange for needed discharge resources and transportation as appropriate  - Identify discharge learning needs (meds, wound care, etc )  - Arrange for interpretive services to assist at discharge as needed  - Refer to Case Management Department for coordinating discharge planning if the patient needs post-hospital services based on physician/advanced practitioner order or complex needs related to functional status, cognitive ability, or social support system  Outcome: Progressing     Problem: Knowledge Deficit  Goal: Patient/family/caregiver demonstrates understanding of disease process, treatment plan, medications, and discharge instructions  Description  Complete learning assessment and assess knowledge base    Interventions:  - Provide teaching at level of understanding  - Provide teaching via preferred learning methods  Outcome: Progressing     Problem: GASTROINTESTINAL - ADULT  Goal: Minimal or absence of nausea and/or vomiting  Description  INTERVENTIONS:  - Administer IV fluids if ordered to ensure adequate hydration  - Maintain NPO status until nausea and vomiting are resolved  - Nasogastric tube if ordered  - Administer ordered antiemetic medications as needed  - Provide nonpharmacologic comfort measures as appropriate  - Advance diet as tolerated, if ordered  - Consider nutrition services referral to assist patient with adequate nutrition and appropriate food choices  Outcome: Progressing     Problem: GENITOURINARY - ADULT  Goal: Maintains or returns to baseline urinary function  Description  INTERVENTIONS:  - Assess urinary function  - Encourage oral fluids to ensure adequate hydration if ordered  - Administer IV fluids as ordered to ensure adequate hydration  - Administer ordered medications as needed  - Offer frequent toileting  - Follow urinary retention protocol if ordered  Outcome: Progressing  Goal: Absence of urinary retention  Description  INTERVENTIONS:  - Assess patients ability to void and empty bladder  - Monitor I/O  - Bladder scan as needed  - Discuss with physician/AP medications to alleviate retention as needed  - Discuss catheterization for long term situations as appropriate  Outcome: Progressing     Problem: SKIN/TISSUE INTEGRITY - ADULT  Goal: Skin integrity remains intact  Description  INTERVENTIONS  - Identify patients at risk for skin breakdown  - Assess and monitor skin integrity  - Assess and monitor nutrition and hydration status  - Monitor labs (i e  albumin)  - Assess for incontinence   - Turn and reposition patient  - Assist with mobility/ambulation  - Relieve pressure over bony prominences  - Avoid friction and shearing  - Provide appropriate hygiene as needed including keeping skin clean and dry  - Evaluate need for skin moisturizer/barrier cream  - Collaborate with interdisciplinary team (i e  Nutrition, Rehabilitation, etc )   - Patient/family teaching  Outcome: Progressing     Problem: MUSCULOSKELETAL - ADULT  Goal: Maintain or return mobility to safest level of function  Description  INTERVENTIONS:  - Assess patient's ability to carry out ADLs; assess patient's baseline for ADL function and identify physical deficits which impact ability to perform ADLs (bathing, care of mouth/teeth, toileting, grooming, dressing, etc )  - Assess/evaluate cause of self-care deficits   - Assess range of motion  - Assess patient's mobility  - Assess patient's need for assistive devices and provide as appropriate  - Encourage maximum independence but intervene and supervise when necessary  - Involve family in performance of ADLs  - Assess for home care needs following discharge   - Consider OT consult to assist with ADL evaluation and planning for discharge  - Provide patient education as appropriate  Outcome: Progressing

## 2019-08-20 NOTE — PROGRESS NOTES
Progress Note - Bethany Gomez 1975, 38 y o  male MRN: [id number]  Unit/Bed#: [place] [address] [address] [id number] Encounter: [id number]  Primary Care Provider: Jamin Juan MD   Date and time admitted to hospital: 8/19/2019  9:42 PM    * Hydronephrosis with urinary obstruction due to ureteral calculus  Assessment & Plan  · Patient presents with 12 hours of LLQ abdominal pain with nausea and vomiting  · CT with evidence of moderate left hydroureteronephrosis with 3 mm obstructing stone in distal left UVJ, moderate perinephric stranding   · Repeat CT this AM: 4 mm calculus at the left ureterovesical junction has not changed in position  Again noted is persistent left hydroureter with left periureteral inflammatory stranding  · Seen by Urology - is an add on hopefully to OR today   · continue with IV ancef for now   · Pain control, IVF hydration   · renal function did increase slightly today, no prior baseline for comparison   · Afebrile, leukocytosis improved   · Check labs in AM     History of PSVT (paroxysmal supraventricular tachycardia)  Assessment & Plan  · History of PSVT, currently in sinus rhythm  · Continue outpatient cardiology follow up       VTE Pharmacologic Prophylaxis:   Pharmacologic: per protocol   Mechanical VTE Prophylaxis in Place: No    Patient Centered Rounds: I have performed bedside rounds with nursing staff today  Discussions with Specialists or Other Care Team Provider:     Education and Discussions with Family / Patient: patient     Time Spent for Care: 20 minutes  More than 50% of total time spent on counseling and coordination of care as described above  Current Length of Stay: 1 day(s)    Current Patient Status: Inpatient   Certification Statement: The patient will continue to require additional inpatient hospital stay due to left ureteral stone     Discharge Plan: pending clinical improvement and to OR     Code Status: Level 1 - Full Code      Subjective:   Doing well currently   States pain is controlled at this time  No other acute complaints  No fevers or chills  Objective:     Vitals:   Temp (24hrs), Av 1 °F (36 7 °C), Min:97 3 °F (36 3 °C), Max:99 °F (37 2 °C)    Temp:  [97 3 °F (36 3 °C)-99 °F (37 2 °C)] 99 °F (37 2 °C)  HR:  [] 82  Resp:  [18-20] 20  BP: (111-156)/(75-97) 111/75  SpO2:  [91 %-98 %] 94 %  There is no height or weight on file to calculate BMI  Input and Output Summary (last 24 hours): Intake/Output Summary (Last 24 hours) at 2019 1006  Last data filed at 2019 0401  Gross per 24 hour   Intake 1295 ml   Output --   Net 1295 ml       Physical Exam:     Physical Exam   Constitutional: No distress  HENT:   Head: Normocephalic  Eyes: Conjunctivae are normal    Neck: Neck supple  Cardiovascular: Normal rate and regular rhythm  Pulmonary/Chest: Effort normal and breath sounds normal    Abdominal: Soft  Bowel sounds are normal  There is tenderness (LLQ tenderness and left CVA tenderness )  Musculoskeletal: He exhibits no edema  Neurological: He is alert  Skin: Skin is warm  He is not diaphoretic  Psychiatric: He has a normal mood and affect  Nursing note and vitals reviewed  Additional Data:     Labs:    Results from last 7 days   Lab Units 19  0457   WBC Thousand/uL 11 03*   HEMOGLOBIN g/dL 13 4   HEMATOCRIT % 39 6   PLATELETS Thousands/uL 181     Results from last 7 days   Lab Units 19  0457 19  2203   SODIUM mmol/L 138 137   POTASSIUM mmol/L 4 5 3 9   CHLORIDE mmol/L 103 101   CO2 mmol/L 25 25   BUN mg/dL 15 15   CREATININE mg/dL 1 24 1 19   ANION GAP mmol/L 10 11   CALCIUM mg/dL 9 5 9 8   ALBUMIN g/dL  --  4 3   TOTAL BILIRUBIN mg/dL  --  0 65   ALK PHOS U/L  --  75   ALT U/L  --  37   AST U/L  --  21   GLUCOSE RANDOM mg/dL 113 155*     Results from last 7 days   Lab Units 19  0457   INR  1 03                       * I Have Reviewed All Lab Data Listed Above    * Additional Pertinent Lab Tests Reviewed: All Labs Within Last 24 Hours Reviewed    Imaging:    Imaging Reports Reviewed Today Include: reviewed  Imaging Personally Reviewed by Myself Includes:  none    Recent Cultures (last 7 days):           Last 24 Hours Medication List:     Current Facility-Administered Medications:  acetaminophen 650 mg Oral Q6H PRN Makayla Smudde, PA-C    aluminum-magnesium hydroxide-simethicone 30 mL Oral Q6H PRN Makayla Smudde, PA-C    cefazolin 1,000 mg Intravenous Q8H Makayla Smudde, PA-C Last Rate: 1,000 mg (08/20/19 0858)   HYDROmorphone 0 5 mg Intravenous Q4H PRN Makayla Smudde, PA-C    ketorolac 30 mg Intravenous Q6H [address] Makayla Smudde, PA-C    ondansetron 4 mg Intravenous Q6H PRN Makayla Smudde, PA-C    oxyCODONE 10 mg Oral Q4H PRN Makayla Smudde, PA-C    oxyCODONE 5 mg Oral Q4H PRN Makayla Smudde, PA-C    senna 1 tablet Oral HS PRN Makayla Smudde, PA-C    sodium chloride 75 mL/hr Intravenous Continuous Makayla Smudde, PA-C Last Rate: 75 mL/hr (08/20/19 0045)        Today, Patient Was Seen By: Sita Kimball PA-C    ** Please Note: Dictation voice to text software may have been used in the creation of this document   **

## 2019-08-20 NOTE — ASSESSMENT & PLAN NOTE
· Patient presents with 12 hours of LLQ abdominal pain with nausea and vomiting  · CT with evidence of moderate left hydroureteronephrosis with 3 mm obstructing stone in distal left UVJ, moderate perinephric stranding   · Repeat CT this AM: 4 mm calculus at the left ureterovesical junction has not changed in position  Again noted is persistent left hydroureter with left periureteral inflammatory stranding     · Seen by Urology   · Gurvinder Lyly to OR s/p cystoscopy with left stent placement   · Okay for discharge from urology standpoint, follow up outpatient in the office in 3-5 days for stent and string removal

## 2019-08-20 NOTE — DISCHARGE SUMMARY
Discharge- Ale Matamoros 1975, 38 y o  male MRN: [id number]  Unit/Bed#: OR POOL Encounter: [id number]  Primary Care Provider: Asim Mandel MD   Date and time admitted to hospital: 8/19/2019  9:42 PM    * Hydronephrosis with urinary obstruction due to ureteral calculus  Assessment & Plan  · Patient presents with 12 hours of LLQ abdominal pain with nausea and vomiting  · CT with evidence of moderate left hydroureteronephrosis with 3 mm obstructing stone in distal left UVJ, moderate perinephric stranding   · Repeat CT this AM: 4 mm calculus at the left ureterovesical junction has not changed in position  Again noted is persistent left hydroureter with left periureteral inflammatory stranding  · Seen by Urology   · Matty Blase to OR s/p cystoscopy with left stent placement   · Okay for discharge from urology standpoint, follow up outpatient in the office in 3-5 days for stent and string removal       History of PSVT (paroxysmal supraventricular tachycardia)  Assessment & Plan  · History of PSVT, currently in sinus rhythm  · Continue outpatient cardiology follow up       Discharging Physician / Practitioner: Augustin Whittaker PA-C  PCP: Asim Mandel MD  Admission Date:   Admission Orders (From admission, onward)     Ordered        08/19/19 2355  Inpatient Admission (expected length of stay for this patient Order details is greater than two midnights)  Once                   Discharge Date: 08/20/19    Resolved Problems  Date Reviewed: 8/20/2019    None          Consultations During Hospital Stay:  · Urology    Procedures Performed:   · S/p cystoscopy with left ureteral stent placement 8/20/19     Significant Findings / Test Results:   · 8/10 CT pelvis without contrast:   IMPRESSION:     A 4 mm calculus at the left ureterovesical junction has not changed in position since abdominopelvic CT of August 19, 2019  Again noted is persistent left hydroureter with left periureteral inflammatory stranding    ·  8/19/19: CT renal stone A/P:   IMPRESSION:     Moderate left-sided hydroureteronephrosis with a 3 mm obstructing stone in the distal left ureter/UVJ  Moderate left-sided perinephric stranding      Mild thickening of the urinary bladder wall which may represent cystitis       Incidental Findings:   · none    Test Results Pending at Discharge (will require follow up):   · none     Outpatient Tests Requested:  · Follow up with urology in 3-5 days for stent removal   · Follow up with PCP in 1 week for post hospital follow up     Complications:  none    Reason for Admission: hydronephrosis with urinary obstruction due to ureteral calcuclus     Hospital Course:     Chin Sal is a 38 y o  male patient who originally presented to the hospital on 8/19/2019 due to left sided abdominal pain  CT revealed moderate left-sided hydroureteronephrosis with a 3 mm obstructing stone in the distal left ureter/UVJ  Urology was consulted and repeat CT morning of 8/20 confirmed a 4 mm calculus at the left ureterovesical junction has not changed in position since abdominopelvic CT of August 19, 2019  Patient went to the OR on 8/20/19 for cystoscopy ureteroscopy with left ureteral stent placement  He was stable for discharge from urology standpoint and per urology recommendations no indication to continue oral antibiotics upon discharge  He will need follow up outpatient with Urology in 3-5 days for stent removal      The patient, initially admitted to the hospital as inpatient, was discharged earlier than expected given the following: patient had left ureteral stent placement and was stable for discharge from urologic standpoint [facility] [place] follow up outpatient for stent removal      Please see above list of diagnoses and related plan for additional information  Condition at Discharge: stable     Discharge Day Visit / Exam:     Subjective:  Doing well post operatively states he feels significantly better  Pain minimal 1-1 5/10  No other complaints  No fevers or chills  Vitals: Blood Pressure: 99/68 (08/20/19 1223)  Pulse: 84 (08/20/19 1223)  Temperature: 99 °F (37 2 °C) (08/20/19 1223)  Temp Source: Temporal (08/20/19 0735)  Respirations: 20 (08/20/19 1223)  Weight - Scale: 90 kg (198 lb 6 6 oz) (08/19/19 2138)  SpO2: 98 % (08/20/19 1223)  Exam:   Physical Exam   Constitutional: No distress  HENT:   Head: Normocephalic and atraumatic  Eyes: Conjunctivae are normal    Neck: Neck supple  Cardiovascular: Normal rate and regular rhythm  Pulmonary/Chest: Effort normal and breath sounds normal    Abdominal: Soft  Bowel sounds are normal    Neurological: He is alert  Skin: Skin is warm  He is not diaphoretic  Psychiatric: He has a normal mood and affect  Nursing note and vitals reviewed  Discussion with Family: patient     Discharge instructions/Information to patient and family:   See after visit summary for information provided to patient and family  Provisions for Follow-Up Care:  See after visit summary for information related to follow-up care and any pertinent home health orders  Disposition:     Home    For Discharges to [facility] SNF:   · Not Applicable to this Patient - Not Applicable to this Patient    Planned Readmission: none     Discharge Statement:  I spent 35 minutes discharging the patient  This time was spent on the day of discharge  I had direct contact with the patient on the day of discharge  Greater than 50% of the total time was spent examining patient, answering all patient questions, arranging and discussing plan of care with patient as well as directly providing post-discharge instructions  Additional time then spent on discharge activities  Discharge Medications:  See after visit summary for reconciled discharge medications provided to patient and family        ** Please Note: This note has been constructed using a voice recognition system **

## 2019-08-20 NOTE — DISCHARGE INSTRUCTIONS
You have stent on a string, which will be removed in 3-5 days  Please take care not to remove with dressing or undressing  Our office staff will contact you to arrange an appointment for removal     Ureteroscopy   WHAT YOU NEED TO KNOW:   A ureteroscopy is a procedure to examine in the inside of your urinary tract, which includes your urethra, bladder, ureters, and kidneys  A ureteroscope is a small, thin tube with a light and camera on the end  Ureteroscopy can help your healthcare provider diagnose and treat problems in your urinary tract, such as kidney stones  DISCHARGE INSTRUCTIONS:   Medicine:   · Antibiotics  may be given to treat or prevent an infection  · Take your medicine as directed  Contact your healthcare provider if you think your medicine is not helping or if you have side effects  Tell him or her if you are allergic to any medicine  Keep a list of the medicines, vitamins, and herbs you take  Include the amounts, and when and why you take them  Bring the list or the pill bottles to follow-up visits  Carry your medicine list with you in case of an emergency  Follow up with your healthcare provider as directed:  Write down your questions so you remember to ask them during your visits  Drink liquids as directed  Liquids can help prevent kidney stones and urinary tract infections  Drink water and limit the amount of caffeine you drink  Caffeine may be found in coffee, tea, soda, sports drinks, and foods  Ask your healthcare provider how much liquid to drink each day  Contact your healthcare provider if:   · You have a fever  · You cannot urinate  · You have blood in your urine  · You are vomiting  · You have pain in your abdomen or side  · You have questions or concerns about your condition or care  © 2017 [address] Information is for End User's use only and may not be sold, redistributed or otherwise used for commercial purposes   All illustrations and images included in CareNotes® are the copyrighted property of [employer]  or [employer]  The above information is an  only  It is not intended as medical advice for individual conditions or treatments  Talk to your doctor, nurse or pharmacist before following any medical regimen to see if it is safe and effective for you

## 2019-08-20 NOTE — ASSESSMENT & PLAN NOTE
· Patient presents with 12 hours of LLQ abdominal pain with nausea and vomiting  · CT with evidence of moderate left hydroureteronephrosis with 3 mm obstructing stone in distal left UVJ, moderate perinephric stranding   · Repeat CT this AM: 4 mm calculus at the left ureterovesical junction has not changed in position  Again noted is persistent left hydroureter with left periureteral inflammatory stranding     · Seen by Urology - is an add on hopefully to OR today   · continue with IV ancef for now   · Pain control, IVF hydration   · renal function did increase slightly today, no prior baseline for comparison   · Afebrile, leukocytosis improved   · Check labs in AM

## 2019-08-20 NOTE — UTILIZATION REVIEW
Initial Clinical Review    Admission: Date/Time/Statement: Inpatient Admission Orders (From admission, onward)     Ordered        08/19/19 2355  Inpatient Admission (expected length of stay for this patient Order details is greater than two midnights)  Once                   Orders Placed This Encounter   Procedures    Inpatient Admission (expected length of stay for this patient Order details is greater than two midnights)     Standing Status:   Standing     Number of Occurrences:   1     Order Specific Question:   Admitting Physician     Answer:   Lavinia Odell     Order Specific Question:   Level of Care     Answer:   Med Surg [16]     Order Specific Question:   Estimated length of stay     Answer:   More than 2 Midnights     Order Specific Question:   Certification     Answer:   I certify that inpatient services are medically necessary for this patient for a duration of greater than two midnights  See H&P and MD Progress Notes for additional information about the patient's course of treatment  ED Arrival Information     Expected Arrival Acuity Means of Arrival Escorted By Service Admission Type    - 8/19/2019 21:32 Urgent Walk-In Spouse Hospitalist Urgent    Arrival Complaint    abdominal pain, back pain        Chief Complaint   Patient presents with    Abdominal Pain     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, then developed a left lower abdomen pain and vomited once and it was brown in color  Assessment/Plan:  39 yo male presents to ED from home with Severe, sharp LLQ pain that radiates to left flank  Earlier in day he became hot & sweaty and thought it may have been related to heat stroke as he was aorking on an [place] conditioned home  Rested for awhile in air conditioning and returned to working but again wasn't feeling well and vomited brown liquid emesis  + tenderness LLQ  + CVA tenderness    CT with evidence of moderate left hydroureteronephrosis with 3 mm obstructing stone in distal left UVJ, moderate perinephric stranding  Admitted to  with Hydronephrosis with Urinary Obstruction due to Ureteral Calculus    Consult Urology, IV Abxs,  IVF's, I Toradol    8/20:     SURGERY DATE: 8/20/2019    Procedure(s) (LRB):  CYSTOSCOPY URETEROSCOPY; RETROGRADE PYELOGRAM AND INSERTION STENT URETERAL (Left)  Operative Findings:  Distal ureteral stone    ED Triage Vitals   Temperature Pulse Respirations Blood Pressure SpO2   08/19/19 2139 08/19/19 2139 08/19/19 2139 08/19/19 2139 08/19/19 2139   (!) 97 3 °F (36 3 °C) 88 18 155/97 98 %      Temp Source Heart Rate Source Patient Position - Orthostatic VS BP Location FiO2 (%)   08/19/19 2139 08/19/19 2139 08/19/19 2139 08/19/19 2139 --   Temporal Monitor Sitting Right arm       Pain Score       08/19/19 2205       Worst Possible Pain        Wt Readings from Last 1 Encounters:   08/19/19 90 kg (198 lb 6 6 oz)     Additional Vital Signs:   08/20/19 1331  97 4 °F (36 3 °C)  76  16  124/87  96 %  None (Room air)   08/20/19 0735  99 °F (37 2 °C)  82  20  111/75  94 %  None (Room air)   08/20/19 0029  97 9 °F (36 6 °C)  94  20  156/86  91 %  None (Room air)       Pertinent Labs/Diagnostic Test Results:   Results from last 7 days   Lab Units 08/20/19 0457 08/19/19 2203   WBC Thousand/uL 11 03* 13 60*   HEMOGLOBIN g/dL 13 4 14 5   HEMATOCRIT % 39 6 41 6   PLATELETS Thousands/uL 181 196     Results from last 7 days   Lab Units 08/20/19 0457 08/19/19  2203   SODIUM mmol/L 138 137   POTASSIUM mmol/L 4 5 3 9   CHLORIDE mmol/L 103 101   CO2 mmol/L 25 25   ANION GAP mmol/L 10 11   BUN mg/dL 15 15   CREATININE mg/dL 1 24 1 19   EGFR ml/min/1 73sq m 70 73   CALCIUM mg/dL 9 5 9 8   MAGNESIUM mg/dL  --  2 1     Results from last 7 days   Lab Units 08/19/19  2203   AST U/L 21   ALT U/L 37   ALK PHOS U/L 75   TOTAL PROTEIN g/dL 7 8   ALBUMIN g/dL 4 3   TOTAL BILIRUBIN mg/dL 0 65     Results from last 7 days   Lab Units 08/20/19  1128   POC GLUCOSE mg/dl 92     Results from last 7 days   Lab Units 08/20/19  0457 08/19/19  2203   GLUCOSE RANDOM mg/dL 113 155*     Results from last 7 days   Lab Units 08/20/19  0457   PROTIME seconds 13 6   INR  1 03     Results from last 7 days   Lab Units 08/19/19  2203   LIPASE u/L 76     Results from last 7 days   Lab Units 08/19/19  2207   CLARITY UA  Turbid   COLOR UA  Yellow   SPEC GRAV UA  1 015   PH UA  8 5*   GLUCOSE UA mg/dl Negative   KETONES UA mg/dl Trace*   BLOOD UA  Small*   PROTEIN UA mg/dl Trace*   NITRITE UA  Negative   BILIRUBIN UA  Negative   UROBILINOGEN UA E U /dl 0 2   LEUKOCYTES UA  Negative   WBC UA /hpf None Seen   RBC UA /hpf 2-4*   BACTERIA UA /hpf Occasional   EPITHELIAL CELLS WET PREP /hpf Occasional     8/19 CT A&P: Moderate left-sided hydroureteronephrosis with a 3 mm obstructing stone in the distal left ureter/UVJ   Moderate left-sided perinephric stranding  Mild thickening of the urinary bladder wall which may represent cystitis  8/20 CT A&P:  A 4 mm calculus at the left ureterovesical junction has not changed in position since abdominopelvic CT of August 19, 2019  Again noted is persistent left hydroureter with left periureteral inflammatory stranding    ED Treatment:   Medication Administration from 08/19/2019 2132 to 08/20/2019 0016       Date/Time Order Dose Route Action     08/19/2019 2205 ondansetron (ZOFRAN) injection 4 mg 4 mg Intravenous Given     08/19/2019 2204 sodium chloride 0 9 % bolus 1,000 mL 1,000 mL Intravenous New Bag     08/19/2019 2205 HYDROmorphone (DILAUDID) injection 1 mg 1 mg Intravenous Given        History reviewed  No pertinent past medical history    Present on Admission:  **None**      Admitting Diagnosis: Hydroureteronephrosis [N13 30]  Abdominal pain [S73 5]  Renal colic on left side [C55]  Leukocytosis, unspecified type [D72 829]  Nausea and vomiting, intractability of vomiting not specified, unspecified vomiting type [R11 2]  Age/Sex: 38 y o  male  Admission Orders:    Current Facility-Administered Medications:  acetaminophen 650 mg Oral Q6H PRN     aluminum-magnesium hydroxide-simethicone 30 mL Oral Q6H PRN     cefazolin 1,000 mg Intravenous Q8H     HYDROmorphone 0 5 mg Intravenous Q4H PRN     ondansetron 4 mg Intravenous Q6H PRN     oxyCODONE 10 mg Oral Q4H PRN     oxyCODONE 5 mg Oral Q4H PRN     phenazopyridine 100 mg Oral TID With Meals     senna 1 tablet Oral HS PRN     sodium chloride 75 mL/hr Intravenous Continuous     tamsulosin 0 4 mg Oral Daily With Dinner       Toradol 30 I q 6h ATC  Strain all urine  IP CONSULT TO UROLOGY    Network Utilization Review Department  Phone: [phone]; Fax [phone]  [email or web address]  ATTENTION: Please call with any questions or concerns to [phone]  and carefully listen to the prompts so that you are directed to the right person  Send all requests for admission clinical reviews, approved or denied determinations and any other requests to fax [phone]   All voicemails are confidential

## 2019-08-20 NOTE — ANESTHESIA PREPROCEDURE EVALUATION
Review of Systems/Medical History  Patient summary reviewed  Chart reviewed      Cardiovascular  Dysrhythmias , history of PSVT,    Pulmonary  Negative pulmonary ROS        GI/Hepatic  Negative GI/hepatic ROS          Negative  ROS        Endo/Other  Negative endo/other ROS      GYN  Negative gynecology ROS          Hematology  Negative hematology ROS      Musculoskeletal  Negative musculoskeletal ROS        Neurology  Negative neurology ROS      Psychology   Negative psychology ROS              Physical Exam    Airway    Mallampati score: II  TM Distance: <3 FB  Neck ROM: full     Dental       Cardiovascular  Rhythm: regular, Rate: normal,     Pulmonary  Breath sounds clear to auscultation,     Other Findings        Anesthesia Plan  ASA Score- 2 Emergent    Anesthesia Type- general with ASA Monitors  Additional Monitors:   Airway Plan:         Plan Factors- Patient instructed to abstain from smoking on day of procedure  Patient did not smoke on day of surgery  Induction- intravenous  Postoperative Plan-     Informed Consent- Anesthetic plan and risks discussed with patient

## 2019-08-22 ENCOUNTER — TELEPHONE (OUTPATIENT)
Dept: UROLOGY | Facility: CLINIC | Age: 44
End: 2019-08-22

## 2019-08-22 LAB — BACTERIA UR CULT: NORMAL

## 2019-08-22 NOTE — TELEPHONE ENCOUNTER
Post Op Note    Allan Chauhan is a 38 y o  male s/p Left ureteroscopic stone extraction, left stent insertion performed 08/20/2019  Allan Chauhan is a patient of Dr Norma Le [facility] [address] [facility]  How would you rate your pain on a scale from 1 to 10, 10 being the worst pain ever? 4  Have your bowel movements been regular? Yes  Do you have any difficulty urinating? No    Do you have any other questions or concerns that I can address at this time? Patient feeling better today than yesterday  Patient experiences discomfort in the left flank with urination and urinary frequency,urgency and blood in urine  Patient reassured those symptoms are normal with ureteral stent  Patient scheduled for stent removal tomorrow  Advised patient to hydrate well with water and may take ibuprofen 600 mg every 6 hours as needed for pain

## 2019-08-23 ENCOUNTER — PROCEDURE VISIT (OUTPATIENT)
Dept: UROLOGY | Facility: CLINIC | Age: 44
End: 2019-08-23
Payer: COMMERCIAL

## 2019-08-23 VITALS
SYSTOLIC BLOOD PRESSURE: 130 MMHG | HEART RATE: 78 BPM | DIASTOLIC BLOOD PRESSURE: 82 MMHG | WEIGHT: 202 LBS | HEIGHT: 71 IN | BODY MASS INDEX: 28.28 KG/M2

## 2019-08-23 DIAGNOSIS — N13.30 HYDROURETERONEPHROSIS: ICD-10-CM

## 2019-08-23 DIAGNOSIS — N23 RENAL COLIC ON LEFT SIDE: ICD-10-CM

## 2019-08-23 PROCEDURE — 99211 OFF/OP EST MAY X REQ PHY/QHP: CPT | Performed by: UROLOGY

## 2019-08-23 NOTE — PROGRESS NOTES
8/23/2019  Allan Chauhan is a 38 y o  male  [id number]        Diagnosis  Chief Complaint     Stent Removal          Patient is s/p left ureteroscopy with stone extraction on 8/20/19 with Dr Jessica Salomon  Follow up in 3 months with PA-C, KUB and ultrasound prior to appointment      Procedure Stent with String Removal    Vitals:    08/23/19 0845   BP: 130/82   Pulse: 78   Weight: 91 6 kg (202 lb)   Height: 5' 10 5" (1 791 m)       Stent with string removed intact without difficulty  Reviewed post stent removal symptoms including flank pain, dysuria, and hematuria  Instructed patient to increase oral fluid intake  Encouraged the use of NSAIDS and other prescribed pain medication as needed for discomfort  Patient instructed to call the office or report to the ER for uncontrolled pain, fever, chills, nausea or vomiting         Gigi Castro RN

## 2019-08-26 LAB
CA PHOS MFR STONE: 5 %
CALCIUM OXALATE DIHYDRATE MFR STONE IR: 10 %
COLOR STONE: NORMAL
COM MFR STONE: 85 %
COMMENT-STONE3: NORMAL
COMPOSITION: NORMAL
LABORATORY COMMENT REPORT: NORMAL
NIDUS STONE QL: NORMAL
PHOTO: NORMAL
SIZE STONE: NORMAL MM
STONE ANALYSIS-IMP: NORMAL
SURFACE CRYSTALS: NORMAL
WT STONE: 21.2 MG

## 2019-11-22 ENCOUNTER — TELEPHONE (OUTPATIENT)
Dept: UROLOGY | Facility: AMBULATORY SURGERY CENTER | Age: 44
End: 2019-11-22

## 2019-11-22 NOTE — TELEPHONE ENCOUNTER
I called and spoke to pcp office [phone]  They will process referral and fax to [facility] at [phone]

## 2019-12-04 PROBLEM — N23 RENAL COLIC ON LEFT SIDE: Status: RESOLVED | Noted: 2019-08-19 | Resolved: 2019-12-04

## 2019-12-04 PROBLEM — N20.0 NEPHROLITHIASIS: Status: ACTIVE | Noted: 2019-12-04

## 2019-12-04 PROBLEM — N13.30 HYDROURETERONEPHROSIS: Status: RESOLVED | Noted: 2019-08-19 | Resolved: 2019-12-04

## 2019-12-04 NOTE — TELEPHONE ENCOUNTER
I called and spoke with patient and made him aware that we have made 2 attempts to reach his pcp for a referral   We have not heard back from them and I checked numerous times in [employer] and there is no referral on file  Patient stated that he would call them and make sure it was done

## 2019-12-04 NOTE — PROGRESS NOTES
12/6/2019      Chief Complaint   Patient presents with    Nephrolithiasis       Assessment and Plan    38 y o  male managed by Dr Tolentino Mercury    1  Nephrolithiasis s/p ureteroscopy 8/19/2019  - KUB and U/S not obtained, will do so now and again in 1 year if within normal limits  - discussed his stone is calcium oxalate in nature and reviewed proper hydration and a low oxalate diet  - [facility] kidney stone education handout provided for further detail    He will obtain KUB and ultrasound now and if normal again in 1 year      History of Present Illness  Valentino Beatty is a 38 y o  male here for follow up evaluation of a 3 mm ureteral calculi status post ureteroscopy in August of 2019  The patient presents today doing very well  He has no urinary complaints and denies any further stone passage  His CT scan at the time of surgery revealed a small 3 mm ureteral calculi  His stone analysis from the time of surgery revealed a predominantly calcium oxalate nephrolithiasis  This was his 1st stone  Review of Systems   Constitutional: Negative for activity change, chills and fever  Gastrointestinal: Negative for abdominal distention and abdominal pain  Musculoskeletal: Negative for back pain and gait problem  Psychiatric/Behavioral: Negative for behavioral problems and confusion  Past Medical History  Past Medical History:   Diagnosis Date    Kidney stone     SVT (supraventricular tachycardia) (HCC)        Past Social History  Past Surgical History:   Procedure Laterality Date    PA CYSTO/URETERO W/LITHOTRIPSY &INDWELL STENT INSRT Left 8/20/2019    Procedure: CYSTOSCOPY URETEROSCOPY; RETROGRADE PYELOGRAM AND INSERTION STENT URETERAL;  Surgeon: Clyde Benson MD;  Location: [facility] OR;  Service: Urology     Social History     Tobacco Use   Smoking Status Never Smoker   Smokeless Tobacco Never Used       Past Family History  No family history on file      Past Social history  Social History Socioeconomic History    Marital status: /Civil Union     Spouse name: Not on file    Number of children: Not on file    Years of education: Not on file    Highest education level: Not on file   Occupational History    Not on file   Social Needs    Financial resource strain: Not on file    Food insecurity:     Worry: Not on file     Inability: Not on file    Transportation needs:     Medical: Not on file     Non-medical: Not on file   Tobacco Use    Smoking status: Never Smoker    Smokeless tobacco: Never Used   Substance and Sexual Activity    Alcohol use: Yes     Frequency: Never     Comment: daily 2 beers    Drug use: Never    Sexual activity: Not on file   Lifestyle    Physical activity:     Days per week: Not on file     Minutes per session: Not on file    Stress: Not on file   Relationships    Social connections:     Talks on phone: Not on file     Gets together: Not on file     Attends Sabianism service: Not on file     Active member of club or organization: Not on file     Attends meetings of clubs or organizations: Not on file     Relationship status: Not on file    Intimate partner violence:     Fear of current or ex partner: Not on file     Emotionally abused: Not on file     Physically abused: Not on file     Forced sexual activity: Not on file   Other Topics Concern    Not on file   Social History Narrative    Not on file       Current Medications  Current Outpatient Medications   Medication Sig Dispense Refill    tamsulosin (FLOMAX) 0 4 mg Take 1 capsule (0 4 mg total) by mouth daily with dinner for 5 days (Patient not taking: Reported on 12/6/2019) 5 capsule 0     No current facility-administered medications for this visit          Allergies  No Known Allergies      The following portions of the patient's history were reviewed and updated as appropriate: allergies, current medications, past medical history, past social history, past surgical history and problem list       Vitals  Vitals:    12/06/19 0840   BP: 120/80   Pulse: 84   Weight: 91 3 kg (201 lb 3 2 oz)   Height: 5' 10 5" (1 791 m)       Physical Exam  Constitutional   General appearance: Patient is seated and in no acute distress, well appearing and well nourished  Head and Face   Head and face: Normal     Eyes   Conjunctiva and lids: No erythema, swelling or discharge  Ears, Nose, Mouth, and Throat   Hearing: Normal     Pulmonary   Respiratory effort: No increased work of breathing or signs of respiratory distress  Cardiovascular   Examination of extremities for edema and/or varicosities: Normal     Abdomen   Abdomen: Non-tender, no masses  Musculoskeletal   Gait and station: Normal    Skin   Skin and subcutaneous tissue: Warm, dry, and intact  No visible lesions or rashes  Psychiatric   Judgment and insight: Normal  Recent and remote memory:  Normal  Mood and affect: Normal      Results  No results found for this or any previous visit (from the past 1 hour(s))  ]  No results found for: PSA  Lab Results   Component Value Date    CALCIUM 9 5 08/20/2019    K 4 5 08/20/2019    CO2 25 08/20/2019     08/20/2019    BUN 15 08/20/2019    CREATININE 1 24 08/20/2019     Lab Results   Component Value Date    WBC 11 03 (H) 08/20/2019    HGB 13 4 08/20/2019    HCT 39 6 08/20/2019    MCV 97 08/20/2019     08/20/2019       Orders  Orders Placed This Encounter   Procedures    XR abdomen 1 view kub     Standing Status:   Future     Standing Expiration Date:   12/6/2023     Scheduling Instructions:      Bring along any outside films relating to this procedure  Order Specific Question:   Reason for Exam:     Answer:   nephrolithiasis    US kidney and bladder     Standing Status:   Future     Standing Expiration Date:   12/6/2023     Scheduling Instructions:      "Prep required if being scheduled in conjunction with other studies, refer to those examination's Preps first before scheduling  All patients for US Kidney and Bladder they must drink 24 oz of water 60 minutes before your scheduled appointment time  This test requires you to have a FULL bladder  Please do not urinate before your test             Please bring your physician order, insurance cards, a form of photo ID and a list of your medications with you  Arrive 15 minutes prior to your appointment time in order to register  If you need to have lab work or a urinalysis, please do this AFTER your ultrasound  "            To schedule this appointment, please contact Central Scheduling at [phone]       Order Specific Question:   Reason for Exam:     Answer:   Calculi

## 2019-12-06 ENCOUNTER — OFFICE VISIT (OUTPATIENT)
Dept: UROLOGY | Facility: CLINIC | Age: 44
End: 2019-12-06
Payer: COMMERCIAL

## 2019-12-06 VITALS
SYSTOLIC BLOOD PRESSURE: 120 MMHG | HEIGHT: 71 IN | DIASTOLIC BLOOD PRESSURE: 80 MMHG | WEIGHT: 201.2 LBS | HEART RATE: 84 BPM | BODY MASS INDEX: 28.17 KG/M2

## 2019-12-06 DIAGNOSIS — N20.0 NEPHROLITHIASIS: Primary | ICD-10-CM

## 2019-12-06 PROCEDURE — 99213 OFFICE O/P EST LOW 20 MIN: CPT | Performed by: PHYSICIAN ASSISTANT

## 2019-12-09 ENCOUNTER — HOSPITAL ENCOUNTER (OUTPATIENT)
Dept: ULTRASOUND IMAGING | Facility: HOSPITAL | Age: 44
Discharge: HOME | End: 2019-12-09
Attending: UROLOGY
Payer: COMMERCIAL

## 2019-12-09 DIAGNOSIS — N13.30 HYDROURETERONEPHROSIS: ICD-10-CM

## 2019-12-09 PROCEDURE — 76770 US EXAM ABDO BACK WALL COMP: CPT

## 2020-12-08 ENCOUNTER — HOSPITAL ENCOUNTER (OUTPATIENT)
Dept: ULTRASOUND IMAGING | Facility: HOSPITAL | Age: 45
Discharge: HOME | End: 2020-12-08
Payer: COMMERCIAL

## 2020-12-08 DIAGNOSIS — N20.0 NEPHROLITHIASIS: ICD-10-CM

## 2020-12-08 PROCEDURE — 76770 US EXAM ABDO BACK WALL COMP: CPT

## 2021-05-11 NOTE — TELEPHONE ENCOUNTER
I called pcp office again and left another detailed message for referral request  Alert-The patient is alert, awake and responds to voice. The patient is oriented to time, place, and person. The triage nurse is able to obtain subjective information.
